# Patient Record
Sex: FEMALE | Race: WHITE | NOT HISPANIC OR LATINO | URBAN - METROPOLITAN AREA
[De-identification: names, ages, dates, MRNs, and addresses within clinical notes are randomized per-mention and may not be internally consistent; named-entity substitution may affect disease eponyms.]

---

## 2018-08-03 ENCOUNTER — OFFICE VISIT (OUTPATIENT)
Dept: URBAN - METROPOLITAN AREA CLINIC 17 | Facility: CLINIC | Age: 73
End: 2018-08-03
Payer: COMMERCIAL

## 2018-08-03 PROCEDURE — 99203 OFFICE O/P NEW LOW 30 MIN: CPT | Performed by: OPTOMETRIST

## 2018-08-03 ASSESSMENT — INTRAOCULAR PRESSURE
OD: 22
OS: 14

## 2018-08-06 NOTE — IMPRESSION/PLAN
Impression: Other herpes zoster eye disease: B02.39.
no corneal involvement Plan: Continue medication given by PCP.  (pt allergic to valcyclovir)

## 2020-03-09 ENCOUNTER — OFFICE VISIT (OUTPATIENT)
Dept: URBAN - METROPOLITAN AREA CLINIC 17 | Facility: CLINIC | Age: 75
End: 2020-03-09
Payer: MEDICARE

## 2020-03-09 DIAGNOSIS — B02.39 OTHER HERPES ZOSTER EYE DISEASE: Primary | ICD-10-CM

## 2020-03-09 PROCEDURE — 99213 OFFICE O/P EST LOW 20 MIN: CPT | Performed by: OPTOMETRIST

## 2020-03-09 ASSESSMENT — INTRAOCULAR PRESSURE
OS: 16
OD: 15

## 2020-03-09 NOTE — IMPRESSION/PLAN
Impression: Other herpes zoster eye disease: B02.39.
no corneal involvement Plan: Discussed diagnosis with pt. Start medication Acyclovir given by PCP. Will continue to observe. Recommend getting vaccine.

## 2020-10-15 ENCOUNTER — OFFICE VISIT (OUTPATIENT)
Dept: URBAN - METROPOLITAN AREA CLINIC 17 | Facility: CLINIC | Age: 75
End: 2020-10-15
Payer: MEDICARE

## 2020-10-15 PROCEDURE — 92004 COMPRE OPH EXAM NEW PT 1/>: CPT | Performed by: OPHTHALMOLOGY

## 2020-10-15 ASSESSMENT — INTRAOCULAR PRESSURE
OD: 16
OS: 15

## 2020-10-15 ASSESSMENT — KERATOMETRY
OS: 43.75
OD: 44.00

## 2020-10-15 ASSESSMENT — VISUAL ACUITY
OD: 20/25
OS: 20/40

## 2020-10-15 NOTE — IMPRESSION/PLAN
Impression: Age-related nuclear cataract, bilateral: H25.13. Condition: established, worsening. Symptoms: could improve with surgery. Plan: Cataract accounts for patient's complaints. Reviewed risks, benefits, and procedure. Patient desires surgery, schedule ce/iol OS then OD, RL2, discussed lens options, distance refractive target, patient is clear for surgery in [facility].

## 2020-10-16 ENCOUNTER — TESTING ONLY (OUTPATIENT)
Dept: URBAN - METROPOLITAN AREA CLINIC 17 | Facility: CLINIC | Age: 75
End: 2020-10-16
Payer: MEDICARE

## 2020-10-16 DIAGNOSIS — H25.13 AGE-RELATED NUCLEAR CATARACT, BILATERAL: Primary | ICD-10-CM

## 2020-10-16 PROCEDURE — 92136 OPHTHALMIC BIOMETRY: CPT | Performed by: OPHTHALMOLOGY

## 2020-10-16 ASSESSMENT — PACHYMETRY
OD: 23.97
OD: 3.02
OS: 23.70
OS: 3.15

## 2020-11-03 ENCOUNTER — SURGERY (OUTPATIENT)
Dept: URBAN - METROPOLITAN AREA SURGERY 7 | Facility: SURGERY | Age: 75
End: 2020-11-03
Payer: MEDICARE

## 2020-11-03 PROCEDURE — 66984 XCAPSL CTRC RMVL W/O ECP: CPT | Performed by: OPHTHALMOLOGY

## 2020-11-04 ENCOUNTER — POST-OPERATIVE VISIT (OUTPATIENT)
Dept: URBAN - METROPOLITAN AREA CLINIC 17 | Facility: CLINIC | Age: 75
End: 2020-11-04
Payer: MEDICARE

## 2020-11-04 PROCEDURE — 99024 POSTOP FOLLOW-UP VISIT: CPT | Performed by: OPTOMETRIST

## 2020-11-04 ASSESSMENT — INTRAOCULAR PRESSURE
OS: 18
OD: 18

## 2020-11-04 NOTE — IMPRESSION/PLAN
Impression: S/P CE/Standard IOL SA60WF +20.50 OS - 1 Day. Encounter for surgical aftercare following surgery on a sense organ  Z48.810. Excellent post op course   Post operative instructions reviewed - Plan: RTC 1 wk PO2 OS as scheduled.  OS:
--Continue Ofloxacin 0.3% QID x1wk then d/c
--Taper Pred-Acetate QID x 2 wk, TID x 1 wk, BID x 1wk, QD x 1wk, then d/c

## 2020-11-12 ENCOUNTER — POST-OPERATIVE VISIT (OUTPATIENT)
Dept: URBAN - METROPOLITAN AREA CLINIC 17 | Facility: CLINIC | Age: 75
End: 2020-11-12
Payer: MEDICARE

## 2020-11-12 DIAGNOSIS — Z48.810 ENCOUNTER FOR SURGICAL AFTERCARE FOLLOWING SURGERY ON A SENSE ORGAN: Primary | ICD-10-CM

## 2020-11-12 PROCEDURE — 99024 POSTOP FOLLOW-UP VISIT: CPT | Performed by: OPTOMETRIST

## 2020-11-12 ASSESSMENT — INTRAOCULAR PRESSURE
OS: 17
OD: 16

## 2020-11-12 ASSESSMENT — VISUAL ACUITY: OS: 20/25

## 2020-11-12 NOTE — IMPRESSION/PLAN
Impression: S/P CE/Standard IOL SA60WF +20.50 OS - 9 Days. Encounter for surgical aftercare following surgery on a sense organ  Z48.810.  Plan: 1 wk, 2nd eye --Taper Prednisolone acetate 1% QID x 1wk, TID x 1wk, BID x 1wk, QD x 1wk

## 2020-11-19 ENCOUNTER — SURGERY (OUTPATIENT)
Dept: URBAN - METROPOLITAN AREA SURGERY 7 | Facility: SURGERY | Age: 75
End: 2020-11-19
Payer: MEDICARE

## 2020-11-19 DIAGNOSIS — H25.11 AGE-RELATED NUCLEAR CATARACT, RIGHT EYE: Primary | ICD-10-CM

## 2020-11-19 PROCEDURE — 66984 XCAPSL CTRC RMVL W/O ECP: CPT | Performed by: OPHTHALMOLOGY

## 2020-11-20 ENCOUNTER — POST-OPERATIVE VISIT (OUTPATIENT)
Dept: URBAN - METROPOLITAN AREA CLINIC 17 | Facility: CLINIC | Age: 75
End: 2020-11-20
Payer: MEDICARE

## 2020-11-20 PROCEDURE — 99024 POSTOP FOLLOW-UP VISIT: CPT | Performed by: OPTOMETRIST

## 2020-11-20 RX ORDER — BRIMONIDINE TARTRATE 1 MG/ML
0.1 % SOLUTION/ DROPS OPHTHALMIC
Qty: 0 | Refills: 0 | Status: INACTIVE
Start: 2020-11-20 | End: 2021-01-07

## 2020-11-20 ASSESSMENT — INTRAOCULAR PRESSURE
OS: 18
OD: 26

## 2020-11-20 NOTE — IMPRESSION/PLAN
Impression: S/P Cataract Extraction by phacoemulsification with IOL placement SA60WF +20.00 OD - 1 Day. Presence of intraocular lens  Z96.1. Post operative instructions reviewed - Elevated IOP, start Alphagan P BID Plan: RTC in 1 week for PO2 
--Continue Ofloxacin 0.3% QID OD x 1 week then d/c
--Start Alphagan P BID OD x 1 week (Sample given) --Taper Prednisolone acetate 1% QID OD x 2wk, TID OD x 1wk, BID OD x 1wk, QD x 1wk
--Taper Prednisolone acetate 1% TID OS x 1 wk, BID OS x 1wk, QD OS x 1wk, then d/c

## 2020-12-02 ENCOUNTER — POST-OPERATIVE VISIT (OUTPATIENT)
Dept: URBAN - METROPOLITAN AREA CLINIC 17 | Facility: CLINIC | Age: 75
End: 2020-12-02
Payer: MEDICARE

## 2020-12-02 PROCEDURE — 99024 POSTOP FOLLOW-UP VISIT: CPT | Performed by: OPTOMETRIST

## 2020-12-02 ASSESSMENT — VISUAL ACUITY
OS: 20/25
OD: 20/25

## 2020-12-02 ASSESSMENT — INTRAOCULAR PRESSURE
OD: 17
OS: 17

## 2020-12-02 NOTE — IMPRESSION/PLAN
Impression: S/P Cataract Extraction by phacoemulsification with IOL placement [id number] OD - 13 Days. Presence of intraocular lens  Z96.1. Post operative instructions reviewed - Condition is improving - Discussed inflammation in OD. Plan: RTC in 3 weeks for PO3 OD:
--Taper Prednisolone acetate 1% QID x 2 days, TID x 1 wk, BID x 1wk, QD x 1wk, then d/c
OS:
--Finish Prednisolone acetate 1% QD x 1 week then d/c

## 2021-01-07 ENCOUNTER — POST-OPERATIVE VISIT (OUTPATIENT)
Dept: URBAN - METROPOLITAN AREA CLINIC 17 | Facility: CLINIC | Age: 76
End: 2021-01-07
Payer: MEDICARE

## 2021-01-07 PROCEDURE — 99024 POSTOP FOLLOW-UP VISIT: CPT | Performed by: OPTOMETRIST

## 2021-01-07 ASSESSMENT — INTRAOCULAR PRESSURE
OD: 18
OS: 17

## 2021-01-07 NOTE — IMPRESSION/PLAN
Impression: S/P Cataract Extraction by phacoemulsification with IOL placement [id number] OD - 49 Days. Presence of intraocular lens  Z96.1. Condition is improving - Plan: RTC in 3 months for DE --Advised patient to use artificial tears for comfort.

## 2021-02-22 ENCOUNTER — OFFICE VISIT (OUTPATIENT)
Dept: URBAN - METROPOLITAN AREA CLINIC 17 | Facility: CLINIC | Age: 76
End: 2021-02-22
Payer: MEDICARE

## 2021-02-22 DIAGNOSIS — H11.32 SUBCONJUNCTIVAL BLEEDING OF LEFT EYE: Primary | ICD-10-CM

## 2021-02-22 DIAGNOSIS — H04.123 DRY EYE SYNDROME OF BILATERAL LACRIMAL GLANDS: ICD-10-CM

## 2021-02-22 PROCEDURE — 99213 OFFICE O/P EST LOW 20 MIN: CPT | Performed by: OPTOMETRIST

## 2021-02-22 ASSESSMENT — INTRAOCULAR PRESSURE
OD: 14
OS: 13

## 2021-02-22 NOTE — IMPRESSION/PLAN
Impression: Presence of intraocular lens: Z96.1. Bilateral. Plan: IOL(s) in good position, will continue to monitor.

## 2021-02-22 NOTE — IMPRESSION/PLAN
Impression: Subconjunctival bleeding of left eye: H11.32. Plan: Discussed diagnosis in detail with patient. Avoid rubbing the eyes. No treatment is required at this time. Will continue to observe condition and or symptoms.

## 2021-04-07 ENCOUNTER — OFFICE VISIT (OUTPATIENT)
Dept: URBAN - METROPOLITAN AREA CLINIC 17 | Facility: CLINIC | Age: 76
End: 2021-04-07
Payer: MEDICARE

## 2021-04-07 DIAGNOSIS — G51.4 FACIAL MYOKYMIA: ICD-10-CM

## 2021-04-07 DIAGNOSIS — H35.62 RETINAL HEMORRHAGE, LEFT EYE: Primary | ICD-10-CM

## 2021-04-07 DIAGNOSIS — Z96.1 PRESENCE OF INTRAOCULAR LENS: ICD-10-CM

## 2021-04-07 PROCEDURE — 99213 OFFICE O/P EST LOW 20 MIN: CPT | Performed by: OPTOMETRIST

## 2021-04-07 ASSESSMENT — INTRAOCULAR PRESSURE
OD: 17
OS: 16

## 2021-04-07 NOTE — IMPRESSION/PLAN
Impression: Retinal hemorrhage, left eye: H35.62. Plan: Discussed diagnosis in detail with patient. Denies DM, had blood work done in February. No treatment is required at this time. Will continue to observe condition and or symptoms.

## 2022-01-17 NOTE — IMPRESSION/PLAN
HTN good control  Prediabetes due for labs  Mammogram reordered.  Continue amlodipine.  Work on healthy lifestyle.  See me next 6 mo for bp check or sooner as needed.    Impression: Facial myokymia: G51.4. Plan: Discussed diagnosis in detail with patient. May be related to stress or decreased sleep. Patient may take Benadryl to help symptoms.

## 2022-05-10 ENCOUNTER — OFFICE VISIT (OUTPATIENT)
Dept: URBAN - METROPOLITAN AREA CLINIC 17 | Facility: CLINIC | Age: 77
End: 2022-05-10
Payer: COMMERCIAL

## 2022-05-10 DIAGNOSIS — H43.812 VITREOUS DEGENERATION, LEFT EYE: Primary | ICD-10-CM

## 2022-05-10 DIAGNOSIS — H26.491 OTHER SECONDARY CATARACT OF RIGHT EYE: ICD-10-CM

## 2022-05-10 DIAGNOSIS — H04.123 DRY EYE SYNDROME OF BILATERAL LACRIMAL GLANDS: ICD-10-CM

## 2022-05-10 PROCEDURE — 92014 COMPRE OPH EXAM EST PT 1/>: CPT | Performed by: OPTOMETRIST

## 2022-05-10 ASSESSMENT — INTRAOCULAR PRESSURE
OD: 17
OS: 16

## 2022-05-10 NOTE — IMPRESSION/PLAN
Impression: Vitreous degeneration, left eye: H43.962. Plan: Discussed diagnosis in detail with patient. There is no evidence of retinal pathology. No treatment is required at this time. Will continue to observe condition and or symptoms. Discussed signs and symptoms of PVD/floaters. Discussed signs and symptoms of retinal detachment/tears. Patient instructed to call the office immediately if any symptoms noted. Patient instructed to call if condition gets worse.

## 2022-05-10 NOTE — IMPRESSION/PLAN
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 Plan: Discussed diagnosis in detail with patient. No treatment is required at this time. Will continue to observe condition and or symptoms. Patient instructed to call if condition gets worse or [facility] worsens.

## 2022-06-07 ENCOUNTER — OFFICE VISIT (OUTPATIENT)
Dept: URBAN - METROPOLITAN AREA CLINIC 17 | Facility: CLINIC | Age: 77
End: 2022-06-07
Payer: COMMERCIAL

## 2022-06-07 DIAGNOSIS — H43.812 VITREOUS DEGENERATION, LEFT EYE: Primary | ICD-10-CM

## 2022-06-07 PROCEDURE — 99213 OFFICE O/P EST LOW 20 MIN: CPT | Performed by: OPTOMETRIST

## 2022-06-07 ASSESSMENT — INTRAOCULAR PRESSURE
OS: 14
OD: 14

## 2022-06-07 NOTE — IMPRESSION/PLAN
Impression: Vitreous degeneration, left eye: H43.812. Condition: established, stable. Plan: Discussed diagnosis in detail with patient. There is no evidence of retinal pathology. No treatment is required at this time. Will continue to observe condition and or symptoms. Discussed signs and symptoms of PVD/floaters. Discussed signs and symptoms of retinal detachment/tears. Patient instructed to call the office immediately if any symptoms noted. Patient instructed to call if condition gets worse.